# Patient Record
Sex: FEMALE | Race: WHITE | NOT HISPANIC OR LATINO
[De-identification: names, ages, dates, MRNs, and addresses within clinical notes are randomized per-mention and may not be internally consistent; named-entity substitution may affect disease eponyms.]

---

## 2017-01-09 ENCOUNTER — RECORDS - HEALTHEAST (OUTPATIENT)
Dept: ADMINISTRATIVE | Facility: OTHER | Age: 67
End: 2017-01-09

## 2017-01-13 ENCOUNTER — RECORDS - HEALTHEAST (OUTPATIENT)
Dept: ADMINISTRATIVE | Facility: OTHER | Age: 67
End: 2017-01-13

## 2017-01-16 ENCOUNTER — AMBULATORY - HEALTHEAST (OUTPATIENT)
Dept: ONCOLOGY | Facility: HOSPITAL | Age: 67
End: 2017-01-16

## 2017-01-18 ENCOUNTER — RECORDS - HEALTHEAST (OUTPATIENT)
Dept: ADMINISTRATIVE | Facility: OTHER | Age: 67
End: 2017-01-18

## 2017-01-25 ENCOUNTER — RECORDS - HEALTHEAST (OUTPATIENT)
Dept: ADMINISTRATIVE | Facility: OTHER | Age: 67
End: 2017-01-25

## 2017-02-22 ENCOUNTER — AMBULATORY - HEALTHEAST (OUTPATIENT)
Dept: INFUSION THERAPY | Facility: HOSPITAL | Age: 67
End: 2017-02-22

## 2017-02-22 ENCOUNTER — OFFICE VISIT - HEALTHEAST (OUTPATIENT)
Dept: ONCOLOGY | Facility: HOSPITAL | Age: 67
End: 2017-02-22

## 2017-02-22 DIAGNOSIS — D47.2 MGUS (MONOCLONAL GAMMOPATHY OF UNKNOWN SIGNIFICANCE): ICD-10-CM

## 2017-02-22 LAB
IGA SERPL-MCNC: 1266 MG/DL (ref 700–1700)
IGA SERPL-MCNC: 296 MG/DL (ref 65–400)
IGM SERPL-MCNC: 55 MG/DL (ref 60–280)

## 2017-02-22 RX ORDER — SODIUM PHOSPHATE,MONO-DIBASIC 19G-7G/118
3 ENEMA (ML) RECTAL DAILY
Status: SHIPPED | COMMUNITY
Start: 2017-02-22

## 2017-02-22 RX ORDER — HYDROXYZINE HYDROCHLORIDE 10 MG/5ML
4 SYRUP ORAL EVERY 6 HOURS PRN
Status: SHIPPED | COMMUNITY
Start: 2017-02-22

## 2017-02-22 RX ORDER — FLUOXETINE 10 MG/1
10 TABLET, FILM COATED ORAL DAILY
Status: SHIPPED | COMMUNITY
Start: 2017-02-22

## 2017-02-22 RX ORDER — LEVOTHYROXINE SODIUM 88 UG/1
88 TABLET ORAL DAILY
Status: SHIPPED | COMMUNITY
Start: 2017-02-22

## 2017-02-22 RX ORDER — ERGOCALCIFEROL 1.25 MG/1
50000 CAPSULE ORAL
Status: SHIPPED | COMMUNITY
Start: 2017-02-22

## 2017-02-22 RX ORDER — ASPIRIN 81 MG/1
81 TABLET, CHEWABLE ORAL DAILY
Status: SHIPPED | COMMUNITY
Start: 2017-02-22

## 2017-02-22 RX ORDER — LIOTHYRONINE SODIUM 5 UG/1
5 TABLET ORAL DAILY
Status: SHIPPED | COMMUNITY
Start: 2017-02-22

## 2017-02-22 ASSESSMENT — MIFFLIN-ST. JEOR: SCORE: 1440.43

## 2017-02-23 LAB
ALBUMIN PERCENT: 55.6 % (ref 51–67)
ALBUMIN SERPL ELPH-MCNC: 3.8 G/DL (ref 3.2–4.7)
ALPHA 1 PERCENT: 3.1 % (ref 2–4)
ALPHA 2 PERCENT: 12.4 % (ref 5–13)
ALPHA1 GLOB SERPL ELPH-MCNC: 0.2 G/DL (ref 0.1–0.3)
ALPHA2 GLOB SERPL ELPH-MCNC: 0.9 G/DL (ref 0.4–0.9)
B-GLOBULIN SERPL ELPH-MCNC: 0.9 G/DL (ref 0.7–1.2)
BETA PERCENT: 13.4 % (ref 10–17)
GAMMA GLOB SERPL ELPH-MCNC: 1.1 G/DL (ref 0.6–1.4)
GAMMA GLOBULIN PERCENT: 15.5 % (ref 9–20)
KAPPA LC UR-MCNC: <0.9 MG/DL
KAPPA LC/LAMBDA UR: NORMAL {RATIO} (ref 0.7–6.2)
LAMBDA LC UR-MCNC: <0.7 MG/DL
M PROTEIN SERPL ELPH-MCNC: 0.5 G/DL
PATH ICD:: NORMAL
PROT PATTERN SERPL ELPH-IMP: NORMAL
PROT SERPL-MCNC: 6.9 G/DL (ref 6–8)
REVIEWING PATHOLOGIST: NORMAL

## 2017-02-24 LAB
KAPPA FREE LIGHT CHAIN, S - HISTORICAL: 2.71 MG/DL (ref 0.33–1.94)
KAPPA/LAMBDA FLC RATIO - HISTORICAL: 1.52 (ref 0.26–1.65)
LAMBDA FREE LIGHT CHAIN, S - HISTORICAL: 1.78 MG/DL (ref 0.57–2.63)

## 2017-02-27 ENCOUNTER — COMMUNICATION - HEALTHEAST (OUTPATIENT)
Dept: ONCOLOGY | Facility: HOSPITAL | Age: 67
End: 2017-02-27

## 2017-02-27 LAB
PATH ICD:: NORMAL
PROT PATTERN SERPL IFE-IMP: NORMAL
REVIEWING PATHOLOGIST: NORMAL

## 2017-02-28 ENCOUNTER — COMMUNICATION - HEALTHEAST (OUTPATIENT)
Dept: ONCOLOGY | Facility: HOSPITAL | Age: 67
End: 2017-02-28

## 2017-03-10 ENCOUNTER — HOSPITAL ENCOUNTER (OUTPATIENT)
Dept: MAMMOGRAPHY | Facility: HOSPITAL | Age: 67
Discharge: HOME | End: 2017-03-10

## 2017-03-10 DIAGNOSIS — Z12.31 VISIT FOR SCREENING MAMMOGRAM: ICD-10-CM

## 2017-03-20 ENCOUNTER — RECORDS - HEALTHEAST (OUTPATIENT)
Dept: ADMINISTRATIVE | Facility: OTHER | Age: 67
End: 2017-03-20

## 2017-06-15 ENCOUNTER — RECORDS - HEALTHEAST (OUTPATIENT)
Dept: ADMINISTRATIVE | Facility: OTHER | Age: 67
End: 2017-06-15

## 2017-07-27 ENCOUNTER — RECORDS - HEALTHEAST (OUTPATIENT)
Dept: ADMINISTRATIVE | Facility: OTHER | Age: 67
End: 2017-07-27

## 2017-08-14 ENCOUNTER — AMBULATORY - HEALTHEAST (OUTPATIENT)
Dept: INFUSION THERAPY | Facility: HOSPITAL | Age: 67
End: 2017-08-14

## 2017-08-14 ENCOUNTER — COMMUNICATION - HEALTHEAST (OUTPATIENT)
Dept: TELEHEALTH | Facility: CLINIC | Age: 67
End: 2017-08-14

## 2017-08-14 DIAGNOSIS — D47.2 MGUS (MONOCLONAL GAMMOPATHY OF UNKNOWN SIGNIFICANCE): ICD-10-CM

## 2017-08-14 LAB
IGA SERPL-MCNC: 1113 MG/DL (ref 700–1700)
IGA SERPL-MCNC: 279 MG/DL (ref 65–400)
IGM SERPL-MCNC: 75 MG/DL (ref 60–280)

## 2017-08-15 LAB
KAPPA FREE LIGHT CHAIN, S - HISTORICAL: 2.22 MG/DL (ref 0.33–1.94)
KAPPA/LAMBDA FLC RATIO - HISTORICAL: 1.36 (ref 0.26–1.65)
LAMBDA FREE LIGHT CHAIN, S - HISTORICAL: 1.63 MG/DL (ref 0.57–2.63)

## 2017-08-16 LAB
ALBUMIN PERCENT: 57.8 % (ref 51–67)
ALBUMIN SERPL ELPH-MCNC: 4 G/DL (ref 3.2–4.7)
ALPHA 1 PERCENT: 2.5 % (ref 2–4)
ALPHA 2 PERCENT: 12.1 % (ref 5–13)
ALPHA1 GLOB SERPL ELPH-MCNC: 0.2 G/DL (ref 0.1–0.3)
ALPHA2 GLOB SERPL ELPH-MCNC: 0.8 G/DL (ref 0.4–0.9)
B-GLOBULIN SERPL ELPH-MCNC: 1 G/DL (ref 0.7–1.2)
BETA PERCENT: 14.2 % (ref 10–17)
GAMMA GLOB SERPL ELPH-MCNC: 0.9 G/DL (ref 0.6–1.4)
GAMMA GLOBULIN PERCENT: 13.4 % (ref 9–20)
M PROTEIN SERPL ELPH-MCNC: 0.3 G/DL
PATH ICD:: NORMAL
PROT PATTERN SERPL ELPH-IMP: NORMAL
PROT SERPL-MCNC: 7 G/DL (ref 6–8)
REVIEWING PATHOLOGIST: NORMAL

## 2017-08-21 ENCOUNTER — OFFICE VISIT - HEALTHEAST (OUTPATIENT)
Dept: ONCOLOGY | Facility: HOSPITAL | Age: 67
End: 2017-08-21

## 2017-08-21 DIAGNOSIS — D47.2 MGUS (MONOCLONAL GAMMOPATHY OF UNKNOWN SIGNIFICANCE): ICD-10-CM

## 2017-10-27 ENCOUNTER — RECORDS - HEALTHEAST (OUTPATIENT)
Dept: ADMINISTRATIVE | Facility: OTHER | Age: 67
End: 2017-10-27

## 2017-12-08 ENCOUNTER — RECORDS - HEALTHEAST (OUTPATIENT)
Dept: ADMINISTRATIVE | Facility: OTHER | Age: 67
End: 2017-12-08

## 2018-03-06 ENCOUNTER — RECORDS - HEALTHEAST (OUTPATIENT)
Dept: ADMINISTRATIVE | Facility: OTHER | Age: 68
End: 2018-03-06

## 2018-05-10 ENCOUNTER — RECORDS - HEALTHEAST (OUTPATIENT)
Dept: ADMINISTRATIVE | Facility: OTHER | Age: 68
End: 2018-05-10

## 2018-08-08 ENCOUNTER — RECORDS - HEALTHEAST (OUTPATIENT)
Dept: ADMINISTRATIVE | Facility: OTHER | Age: 68
End: 2018-08-08

## 2018-08-20 ENCOUNTER — AMBULATORY - HEALTHEAST (OUTPATIENT)
Dept: INFUSION THERAPY | Facility: HOSPITAL | Age: 68
End: 2018-08-20

## 2018-08-20 DIAGNOSIS — D47.2 MGUS (MONOCLONAL GAMMOPATHY OF UNKNOWN SIGNIFICANCE): ICD-10-CM

## 2018-08-20 LAB
BASOPHILS # BLD AUTO: 0.1 THOU/UL (ref 0–0.2)
BASOPHILS NFR BLD AUTO: 1 % (ref 0–2)
EOSINOPHIL # BLD AUTO: 0.6 THOU/UL (ref 0–0.4)
EOSINOPHIL NFR BLD AUTO: 7 % (ref 0–6)
ERYTHROCYTE [DISTWIDTH] IN BLOOD BY AUTOMATED COUNT: 12.7 % (ref 11–14.5)
HCT VFR BLD AUTO: 42.3 % (ref 35–47)
HGB BLD-MCNC: 14 G/DL (ref 12–16)
IGA SERPL-MCNC: 1061 MG/DL (ref 700–1700)
IGA SERPL-MCNC: 242 MG/DL (ref 65–400)
IGM SERPL-MCNC: 62 MG/DL (ref 60–280)
LYMPHOCYTES # BLD AUTO: 3.1 THOU/UL (ref 0.8–4.4)
LYMPHOCYTES NFR BLD AUTO: 40 % (ref 20–40)
MCH RBC QN AUTO: 30.6 PG (ref 27–34)
MCHC RBC AUTO-ENTMCNC: 33.1 G/DL (ref 32–36)
MCV RBC AUTO: 93 FL (ref 80–100)
MONOCYTES # BLD AUTO: 0.5 THOU/UL (ref 0–0.9)
MONOCYTES NFR BLD AUTO: 6 % (ref 2–10)
NEUTROPHILS # BLD AUTO: 3.6 THOU/UL (ref 2–7.7)
NEUTROPHILS NFR BLD AUTO: 46 % (ref 50–70)
PLATELET # BLD AUTO: 276 THOU/UL (ref 140–440)
PMV BLD AUTO: 8.7 FL (ref 8.5–12.5)
RBC # BLD AUTO: 4.57 MILL/UL (ref 3.8–5.4)
WBC: 7.9 THOU/UL (ref 4–11)

## 2018-08-22 LAB
KAPPA LC FREE SER-MCNC: 1.16 MG/DL (ref 0.33–1.94)
KAPPA LC FREE/LAMBDA FREE SER NEPH: 0.69 {RATIO} (ref 0.26–1.65)
LAMBDA LC FREE SERPL-MCNC: 1.67 MG/DL (ref 0.57–2.63)

## 2018-08-23 ENCOUNTER — COMMUNICATION - HEALTHEAST (OUTPATIENT)
Dept: ONCOLOGY | Facility: HOSPITAL | Age: 68
End: 2018-08-23

## 2018-08-23 LAB
ALBUMIN PERCENT: 60.2 % (ref 51–67)
ALBUMIN SERPL ELPH-MCNC: 4.2 G/DL (ref 3.2–4.7)
ALPHA 1 PERCENT: 2.5 % (ref 2–4)
ALPHA 2 PERCENT: 12 % (ref 5–13)
ALPHA1 GLOB SERPL ELPH-MCNC: 0.2 G/DL (ref 0.1–0.3)
ALPHA2 GLOB SERPL ELPH-MCNC: 0.8 G/DL (ref 0.4–0.9)
B-GLOBULIN SERPL ELPH-MCNC: 0.9 G/DL (ref 0.7–1.2)
BETA PERCENT: 12.9 % (ref 10–17)
GAMMA GLOB SERPL ELPH-MCNC: 0.9 G/DL (ref 0.6–1.4)
GAMMA GLOBULIN PERCENT: 12.4 % (ref 9–20)
M PROTEIN SERPL ELPH-MCNC: 0.1 G/DL
PATH ICD:: NORMAL
PROT PATTERN SERPL ELPH-IMP: NORMAL
PROT SERPL-MCNC: 6.9 G/DL (ref 6–8)
REVIEWING PATHOLOGIST: NORMAL

## 2018-08-24 ENCOUNTER — HOSPITAL ENCOUNTER (OUTPATIENT)
Dept: MAMMOGRAPHY | Facility: CLINIC | Age: 68
Discharge: HOME | End: 2018-08-24

## 2018-08-24 DIAGNOSIS — Z12.31 VISIT FOR SCREENING MAMMOGRAM: ICD-10-CM

## 2021-05-26 ENCOUNTER — RECORDS - HEALTHEAST (OUTPATIENT)
Dept: ADMINISTRATIVE | Facility: CLINIC | Age: 71
End: 2021-05-26

## 2021-05-27 ENCOUNTER — RECORDS - HEALTHEAST (OUTPATIENT)
Dept: ADMINISTRATIVE | Facility: CLINIC | Age: 71
End: 2021-05-27

## 2021-05-28 ENCOUNTER — RECORDS - HEALTHEAST (OUTPATIENT)
Dept: ADMINISTRATIVE | Facility: CLINIC | Age: 71
End: 2021-05-28

## 2021-05-30 VITALS — BODY MASS INDEX: 33.92 KG/M2 | HEIGHT: 65 IN | WEIGHT: 203.6 LBS

## 2021-05-31 ENCOUNTER — RECORDS - HEALTHEAST (OUTPATIENT)
Dept: ADMINISTRATIVE | Facility: CLINIC | Age: 71
End: 2021-05-31

## 2021-05-31 VITALS — BODY MASS INDEX: 41.45 KG/M2 | WEIGHT: 247.2 LBS

## 2021-06-01 ENCOUNTER — RECORDS - HEALTHEAST (OUTPATIENT)
Dept: ADMINISTRATIVE | Facility: CLINIC | Age: 71
End: 2021-06-01

## 2021-06-02 ENCOUNTER — RECORDS - HEALTHEAST (OUTPATIENT)
Dept: ADMINISTRATIVE | Facility: CLINIC | Age: 71
End: 2021-06-02

## 2021-06-08 NOTE — PROGRESS NOTES
E-mail message received for Hematology Consult from Dr Prince, called Selena to set up appointment. Same scheduled for Wednesday February 22nd  at 11:30 am with , with a nurse apt at  11:00 am. E mail sent to Selena with informational letter, MD bio card, [facility] intake form, medication and allergy list, and appointment letter. Work up for M-THELMA has been done at [facility], PCP is [facility] Dr Sury Gtz, Dr Farias in sports medicine at  sees her for carpal tunnel and has been seen at [facility] Irritable Bowel Disease states all hospitalizations were at [facility] will need to pull records into [employer]. Medical records will collect any outside records.      I explained that Selena would becoming to a cancer center and that the doctors are both cancer and blood doctors. Explained the appointment process of arriving early and bringing his Health History and medication allergy list along to his appointment.     Given [facility] Number to call if he has further questions or concerns. [phone]

## 2021-06-09 NOTE — CONSULTS
Consults   [facility] Hematology and Oncology Consult Note    Patient: Selena Sparrow  MRN: [id number]  Date of Service: 02/22/2017        Reason for Visit     1. MGUS (monoclonal gammopathy of unknown significance)  Immunoglobulin Free Light Chains, Serum    Immunofixation Electrophoresis, Serum    Immunoglobulins IgG, IgA, IgM    Lactate Dehydrogenase (LDH)    Beta-2-Microglobulin (Beta-2-M) (add-ons/treatment plan)    Immunoglobulin Total Light Chains, Urine    Immunoglobulin Free Light Chains, Serum    Immunofixation Electrophoresis, Serum    Immunoglobulins IgG, IgA, IgM    Lactate Dehydrogenase (LDH)    HM1(CBC and Differential)    Comprehensive Metabolic Panel    Immunoglobulin Free Light Chains, Serum    Immunoglobulins IgG, IgA, IgM    Electrophoresis, Protein, Serum    Electrophoresis, Protein, Serum         Assessment     1.  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  2.  History of Crohn's disease/ulcerative colitis which responds very well to Remicade.  3.  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  4.  History of other autoimmune conditions including autoimmune thyroid disease.    Plan     1.  I had lengthy discussion with the patient regarding her etiology of monoclonal gammopathy.  I do not think this monoclonal myopathy is caused by Remicade.  It is possible that some of this gammopathy is actually a reflection of autoimmune conditions that she currently harbors.  This will be hard to prove however.  2.  I am going to check her immunoglobulin levels as well as light chain levels.  If they are within normal limits then I think it is probably safe to go ahead with Remicade treatment since symptomatically she does so much better.  3.  She will follow-up with me in 6 months time and will repeat her immunoglobulin levels.  If they stay stable for few checkups then she will not need any  other testing.  If this start to go up then we may have to look at lymphoproliferative disorder as a diagnosis.  4.  Continue other treatment for her ulcerative colitis.      Staging History     No matching staging information was found for the patient.    History     Patient is very pleasant 67-year-old woman who has been referred to me by Dr. Chowdary from [facility] for evaluation of monoclonal gammopathy of IgG kappa type.  The patient was initially diagnosed to have this condition back in May 2016 when she had some neuropathy type of symptoms after initial treatment with Remicade.  She was getting Remicade for her Crohn's disease/ulcerative colitis.  The patient is initially was under the impression that B monogammopathy represented Remicade immunoglobulins in her blood.  She had them repeated and there was no change.  She had small amount of monoclonal gammopathy.    Since Remicade control her symptoms really well Dr. Chowdary felt that if we can be sure that the monoclonal gammopathy is not caused by Remicade then she can go back on Remicade.    The patient has a history of autoimmune conditions including ulcerative colitis/Crohn's disease as well as autoimmune thyroid disease.    Past Medical History     Past Medical History:   Diagnosis Date     Hypothyroidism      Ulcerative colitis        Past Social History     Social History     Social History     Marital status:      Spouse name: N/A     Number of children: 0     Years of education: N/A     Occupational History     Retired      Social History Main Topics     Smoking status: Never Smoker     Smokeless tobacco: Never Used     Alcohol use Yes     Drug use: No     Sexual activity: No     Other Topics Concern     Not on file     Social History Narrative       Family History     Family History   Problem Relation Age of Onset     Cancer Mother      Ovarian cancer Mother      Prostate cancer Father      No Medical Problems Maternal Grandmother      No  Medical Problems Maternal Grandfather      No Medical Problems Paternal Grandmother      No Medical Problems Paternal Grandfather      No Medical Problems Maternal Aunt      No Medical Problems Paternal Aunt      Hyperlipidemia Brother      Hypertension Brother      BRCA 1/2 Neg Hx      Breast cancer Neg Hx      Colon cancer Neg Hx      Endometrial cancer Neg Hx        Medication List     Prior to Admission medications    Medication Sig       aspirin 81 mg chewable tablet Chew 81 mg daily.       BIFIDOBACTERIUM INFANTIS (ALIGN ORAL) Take by mouth daily.       budesonide (UCERIS) 9 mg TaDE Take 1 tablet by mouth daily.       chlorpheniramine (CHLOR-TRIMETON) 4 mg tablet Take 4 mg by mouth every 6 (six) hours as needed for allergies.       doxylamine (UNISON) 25 mg tablet Take 25 mg by mouth bedtime as needed for sleep.       ergocalciferol (VITAMIN D2) 50,000 unit capsule Take 50,000 Units by mouth 2 (two) times a week.       FLUoxetine (PROZAC) 10 MG tablet Take 10 mg by mouth daily.       fluticasone (FLONASE) 50 mcg/actuation nasal spray 1 spray into each nostril as needed for rhinitis.       FOLIC ACID/MV,IRON,MIN (CENTRUM ORAL) Take 1 tablet by mouth daily.       glucosamine-chondroitin 500-400 mg cap Take 3 capsules by mouth daily.       hyoscyamine (LEVSIN/SL) 0.125 mg SL tablet Take 0.125 mg by mouth every 4 (four) hours as needed for cramping.       levothyroxine (SYNTHROID, LEVOTHROID) 88 MCG tablet Take 88 mcg by mouth daily.       liothyronine (CYTOMEL) 5 MCG tablet Take 5 mcg by mouth daily.       omeprazole (PRILOSEC) 20 MG capsule Take 20 mg by mouth 2 (two) times a day before meals.       VIT C/VIT E/LUTEIN/MIN/OMEGA-3 (OCUVITE ORAL) Take 1 tablet by mouth daily.           Allergies     Allergies   Allergen Reactions     Clindamycin Rash     Heparin Rash     Penicillins Rash     Sulfa (Sulfonamide Antibiotics) Rash       Review of Systems   A comprehensive review of 14 systems was done. Pertinent  "findings noted here and in history of present illness. All the rest negative.    Pain  Currently in Pain: No/denies    Physical Exam     Recent Vitals 2/22/2017   Height 5' 4.75\"   Weight 203 lbs 10 oz   BSA (m2) 2.05 m2   /63   Pulse 82   Temp 98.2   Temp src 1   SpO2 98       Constitutional: Oriented to person, place, and time and appears well-developed.   HEENT:  Normocephalic and atraumatic.  Eyes: Conjunctivae and EOM are normal. Pupils are equal, round, and reactive to light. No discharge.  No scleral icterus. Nose normal. Mouth/Throat: Oropharynx is clear and moist. No oropharyngeal exudate.    NECK: Normal range of motion. Neck supple. No JVD present. No tracheal deviation present. No thyromegaly present.   CARDIOVASCULAR: Normal rate, regular rhythm and intact distal pulses.  Exam reveals no gallop and no friction rub.  Systolic murmur present.  PULMONARY: Effort normal and breath sounds normal. No respiratory distress.No Wheezing or rales.  ABDOMEN: Soft. Bowel sounds are normal. No distension and no mass.  There is no tenderness. There is no rebound and no guarding. No HSM.  MUSCULOSKELETAL: Normal range of motion. No edema and no tenderness. Mild kyphosis, no tenderness.  LYMPH NODES: Has no cervical, supraclavicular, axillary and groin adenopathy.   NEUROLOGICAL: Alert and oriented to person, place, and time. No cranial nerve deficit.  Normal muscle tone. Coordination normal.   GENITOURINARY: Deferred exam.  SKIN: Skin is warm and dry. No rash noted. No erythema. No pallor.   EXTREMITIES: No cyanosis, no clubbing, no edema. No Deformity.  PSYCHIATRIC: Normal mood, affect and behavior.      Lab Results       Lab Results   Component Value Date    ALBUMIN 3.4 (L) 05/20/2015    ALT 17 05/20/2015    AST 21 05/20/2015    BUN 12 05/20/2015    CALCIUM 9.9 05/20/2015     (H) 05/20/2015    CHOL 236 (H) 05/20/2015    CO2 19 (L) 05/20/2015    CREATININE 0.77 05/20/2015    GFRAA >60 05/20/2015    " GFRNONAA >60 05/20/2015     05/20/2015    HCT 40.3 05/13/2013    WBC 9.1 05/13/2013    HGB 13.7 05/14/2013     05/13/2013    K 4.3 05/20/2015     05/20/2015     (H) 02/22/2017    IGG 1266 02/22/2017    IGM 55 (L) 02/22/2017     02/22/2017        Ref Range & Units 2/22/17  2:18 PM     [place] Free Light Chains 0.3300 - 1.94 mg/dL 2.71 (H)   Lambda Free Light Chain 0.5700 - 2.63 mg/dL 1.78   Kappa/Lambda FLC Ratio 0.2600 - 1.65 1.52   Comments:            Imaging Results     Reviewed her last CT scan in April 2015 which was negative for any bony lesions.  Reviewed her last mammogram which was done in June 2015.  [facility] which showed a lot of stool in her colon.  No bony lesion seen in there as well.  Her last bone mineral density done in March 2016 was normal.    Total time spent was 60 minutes, more than half of it was in face-to-face counseling regarding disease state, review of available images, laboratory values, pathological reports, treatment, options, their side effects and management.

## 2021-06-12 ENCOUNTER — HEALTH MAINTENANCE LETTER (OUTPATIENT)
Age: 71
End: 2021-06-12

## 2021-06-12 NOTE — PROGRESS NOTES
[facility] Progress Note    Patient: Selena Sparrow  MRN: [id number]  Date of Service: 8/21/2017        Reason for visit      1. MGUS (monoclonal gammopathy of unknown significance)        Assessment     1.  A very pleasant 67 y.o.  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 Labs stable.  2.  History of Crohn's disease/ulcerative colitis which responds very well to Remicade.  3.  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  4.  History of other autoimmune conditions including autoimmune thyroid disease.      Plan     1. Repeat labs in a year.  2. Continue Ramicade.  3. F/u with her PCP and GI.    Clinical stage      No matching staging information was found for the patient.    History     Selena Sparrow is a very pleasant 67 y.o. old female with a history of monoclonal gammopathy of IgG kappa type.  The patient was initially diagnosed to have this condition back in May 2016 when she had some neuropathy type of symptoms after initial treatment with Remicade.  She was getting Remicade for her Crohn's disease/ulcerative colitis.  The patient is initially was under the impression that B monogammopathy represented Remicade immunoglobulins in her blood.  She had them repeated and there was no change.  She had small amount of monoclonal gammopathy.     Since Remicade control her symptoms really well Dr. Chowdary felt that if we can be sure that the monoclonal gammopathy is not caused by Remicade then she can go back on Remicade.     The patient has a history of autoimmune conditions including ulcerative colitis/Crohn's disease as well as autoimmune thyroid disease  She has resumed Ramicade for her crohns disease. Her symptoms are better.      Past Medical History     Past Medical History:   Diagnosis Date     Hypothyroidism      Ulcerative colitis            Review of Systems   Constitutional  Constitutional (WDL):  Exceptions to WDL  Weight Gain: 5 - <10% from baseline (Up 44 lbs since last OV, 2/22.)  Neurosensory  Neurosensory (WDL): All neurosensory elements are within defined limits  Cardiovascular  Cardiovascular (WDL): All cardiovascular elements are within defined limits  Pulmonary  Respiratory (WDL): Exceptions to WDL (Reports having seasonal allergies.)  Cough: Mild symptoms, nonprescription intervention indicated  Gastrointestinal  Gastrointestinal (WDL): Exceptions to WDL  Diarrhea: Increase of <4 stools per day over baseline, mild increase in ostomy output compared to baseline (Periodically.)  Dry Mouth: Symptomatic (e.g., dry or thick saliva) without significant dietary alteration, unstimulated saliva flow >0.2 ml/min  Genitourinary  Genitourinary (WDL): All genitourinary elements are within defined limits  Integumentary  Integumentary (WDL): All integumentary elements are within defined limits  Patient Coping  Patient Coping: Accepting  Accompanied by  Accompanied by: Alone    ECOG performance status and Distress Assessment      ECOG Performance:    ECOG Performance Status: 1    Distress Assessment  Distress Assessment Score: No distress:     Pain Status  Currently in Pain: No/denies        Vital Signs     Vitals:    08/21/17 1514   BP: 145/69   Pulse: 93   Temp: 98.3  F (36.8  C)   SpO2: 96%       Physical Exam     GENERAL: No acute distress. Cooperative in conversation.   HEENT: Pupils are equal, round and reactive. Oral mucosa is clean and intact. No ulcerations or mucositis noted. No bleeding noted.  RESP:Chest symmetric lungs are clear bilaterally per auscultation. Regular respiratory rate. No wheezes or rhonchi.  CV: Normal S1 S2 Regular, rate and rhythm. No murmurs.  ABD: Nondistended, soft, nontender. Positive bowel sounds. No organomegaly.   EXTREMITIES: No lower extremity edema.   NEURO: Non- focal. Alert and oriented x3.  Cranial nerves appear intact.  PSYCH: Within normal limits. No depression or  anxiety.  SKIN: Warm dry intact.    LYMPH NODES: Bilateral cervical, supraclavicular, axillary lymph node examination was done.  Negative for any palpable adenopathy.      Lab Results     Results for orders placed or performed in visit on 08/14/17   Comprehensive Metabolic Panel   Result Value Ref Range    Sodium 143 136 - 145 mmol/L    Potassium 4.6 3.5 - 5.0 mmol/L    Chloride 106 98 - 107 mmol/L    CO2 30 22 - 31 mmol/L    Anion Gap, Calculation 7 5 - 18 mmol/L    Glucose 90 70 - 125 mg/dL    BUN 14 8 - 22 mg/dL    Creatinine 0.92 0.60 - 1.10 mg/dL    GFR MDRD Af Amer >60 >60 mL/min/1.73m2    GFR MDRD Non Af Amer >60 >60 mL/min/1.73m2    Bilirubin, Total 0.4 0.0 - 1.0 mg/dL    Calcium 9.5 8.5 - 10.5 mg/dL    Protein, Total 7.3 6.0 - 8.0 g/dL    Albumin 3.6 3.5 - 5.0 g/dL    Alkaline Phosphatase 110 45 - 120 U/L    AST 21 0 - 40 U/L    ALT 20 0 - 45 U/L   Immunoglobulin Free Light Chains, Serum   Result Value Ref Range    Kappa Free Light Chains 2.22 (H) 0.3300 - 1.94 mg/dL    Lambda Free Light Chain 1.63 0.5700 - 2.63 mg/dL    Kappa/Lambda FLC Ratio 1.36 0.2600 - 1.65   Immunoglobulins IgG, IgA, IgM   Result Value Ref Range    Immunoglobulin G 1113 700 - 1700 mg/dL    Immunoglobulin M 75 60 - 280 mg/dL    Immunoglobulin A 279 65 - 400 mg/dL   Electrophoresis, Protein, Serum   Result Value Ref Range    Albumin % 57.8 51.0 - 67.0 %    Albumin  4.0 3.2 - 4.7 g/dL    Alpha 1 % 2.5 2.0 - 4.0 %    Alpha 1 0.2 0.1 - 0.3 g/dL    Alpha 2 % 12.1 5.0 - 13.0 %    Alpha 2 0.8 0.4 - 0.9 g/dL    % Beta 14.2 10.0 - 17.0 %    Beta 1.0 0.7 - 1.2 g/dL    Gamma Globulin % 13.4 9.0 - 20.0 %    Gamma Globulin 0.9 0.6 - 1.4 g/dL    ELP Comment       Small monoclonal peak detected in gamma region. Suggest immunofixation electrophoresis to further characterize.     Protein, Total 7.0 6.0 - 8.0 g/dL    Monoclonal Peak 0.3 g/dL    Path ICD: D47.2     Interpreted By: Shnae Fung MD    HM1 (CBC with Diff)   Result Value Ref Range     WBC 7.1 4.0 - 11.0 thou/uL    RBC 4.91 3.80 - 5.40 mill/uL    Hemoglobin 13.7 12.0 - 16.0 g/dL    Hematocrit 42.4 35.0 - 47.0 %    MCV 86 80 - 100 fL    MCH 27.9 27.0 - 34.0 pg    MCHC 32.3 32.0 - 36.0 g/dL    RDW 15.3 (H) 11.0 - 14.5 %    Platelets 279 140 - 440 thou/uL    MPV 8.3 (L) 8.5 - 12.5 fL    Neutrophils % 42 (L) 50 - 70 %    Lymphocytes % 48 (H) 20 - 40 %    Monocytes % 6 2 - 10 %    Eosinophils % 5 0 - 6 %    Basophils % 0 0 - 2 %    Neutrophils Absolute 2.9 2.0 - 7.7 thou/uL    Lymphocytes Absolute 3.4 0.8 - 4.4 thou/uL    Monocytes Absolute 0.4 0.0 - 0.9 thou/uL    Eosinophils Absolute 0.3 0.0 - 0.4 thou/uL    Basophils Absolute 0.0 0.0 - 0.2 thou/uL         Imaging Results     No results found.      López Prince MD

## 2021-06-15 PROBLEM — D47.2 MGUS (MONOCLONAL GAMMOPATHY OF UNKNOWN SIGNIFICANCE): Status: ACTIVE | Noted: 2017-02-22

## 2021-07-13 ENCOUNTER — RECORDS - HEALTHEAST (OUTPATIENT)
Dept: ADMINISTRATIVE | Facility: CLINIC | Age: 71
End: 2021-07-13

## 2021-07-21 ENCOUNTER — RECORDS - HEALTHEAST (OUTPATIENT)
Dept: ADMINISTRATIVE | Facility: CLINIC | Age: 71
End: 2021-07-21

## 2021-10-02 ENCOUNTER — HEALTH MAINTENANCE LETTER (OUTPATIENT)
Age: 71
End: 2021-10-02

## 2022-07-09 ENCOUNTER — HEALTH MAINTENANCE LETTER (OUTPATIENT)
Age: 72
End: 2022-07-09

## 2022-09-03 ENCOUNTER — HEALTH MAINTENANCE LETTER (OUTPATIENT)
Age: 72
End: 2022-09-03

## 2023-07-22 ENCOUNTER — HEALTH MAINTENANCE LETTER (OUTPATIENT)
Age: 73
End: 2023-07-22